# Patient Record
Sex: FEMALE | Race: WHITE | Employment: STUDENT | URBAN - METROPOLITAN AREA
[De-identification: names, ages, dates, MRNs, and addresses within clinical notes are randomized per-mention and may not be internally consistent; named-entity substitution may affect disease eponyms.]

---

## 2017-02-27 DIAGNOSIS — F41.9 ANXIETY: ICD-10-CM

## 2017-02-27 RX ORDER — SERTRALINE HYDROCHLORIDE 100 MG/1
100 TABLET, FILM COATED ORAL DAILY
Qty: 30 TAB | Refills: 0 | Status: SHIPPED | OUTPATIENT
Start: 2017-02-27 | End: 2020-10-09

## 2019-08-09 LAB
HBSAG, EXTERNAL: NEGATIVE
HIV, EXTERNAL: NONREACTIVE
RUBELLA, EXTERNAL: 3.79
T. PALLIDUM, EXTERNAL: NEGATIVE
TYPE, ABO & RH, EXTERNAL: NORMAL

## 2020-03-05 LAB — GRBS, EXTERNAL: POSITIVE

## 2020-04-05 ENCOUNTER — HOSPITAL ENCOUNTER (INPATIENT)
Age: 25
LOS: 3 days | Discharge: HOME | End: 2020-04-08
Attending: OBSTETRICS & GYNECOLOGY | Admitting: OBSTETRICS & GYNECOLOGY
Payer: COMMERCIAL

## 2020-04-05 ENCOUNTER — ANESTHESIA EVENT (OUTPATIENT)
Dept: LABOR AND DELIVERY | Age: 25
End: 2020-04-05
Payer: COMMERCIAL

## 2020-04-05 ENCOUNTER — ANESTHESIA (OUTPATIENT)
Dept: LABOR AND DELIVERY | Age: 25
End: 2020-04-05
Payer: COMMERCIAL

## 2020-04-05 LAB
ALBUMIN SERPL-MCNC: 3.1 G/DL (ref 3.5–5)
ALBUMIN/GLOB SERPL: 0.8 {RATIO} (ref 1.1–2.2)
ALP SERPL-CCNC: 144 U/L (ref 45–117)
ALT SERPL-CCNC: 14 U/L (ref 12–78)
ANION GAP SERPL CALC-SCNC: 8 MMOL/L (ref 5–15)
AST SERPL-CCNC: 14 U/L (ref 15–37)
BASOPHILS # BLD: 0 K/UL (ref 0–0.1)
BASOPHILS NFR BLD: 0 % (ref 0–1)
BILIRUB SERPL-MCNC: 0.2 MG/DL (ref 0.2–1)
BUN SERPL-MCNC: 6 MG/DL (ref 6–20)
BUN/CREAT SERPL: 10 (ref 12–20)
CALCIUM SERPL-MCNC: 8.8 MG/DL (ref 8.5–10.1)
CHLORIDE SERPL-SCNC: 107 MMOL/L (ref 97–108)
CO2 SERPL-SCNC: 21 MMOL/L (ref 21–32)
CREAT SERPL-MCNC: 0.62 MG/DL (ref 0.55–1.02)
DIFFERENTIAL METHOD BLD: ABNORMAL
EOSINOPHIL # BLD: 0.1 K/UL (ref 0–0.4)
EOSINOPHIL NFR BLD: 1 % (ref 0–7)
ERYTHROCYTE [DISTWIDTH] IN BLOOD BY AUTOMATED COUNT: 12.1 % (ref 11.5–14.5)
GLOBULIN SER CALC-MCNC: 3.8 G/DL (ref 2–4)
GLUCOSE SERPL-MCNC: 86 MG/DL (ref 65–100)
HCT VFR BLD AUTO: 31.7 % (ref 35–47)
HGB BLD-MCNC: 10.7 G/DL (ref 11.5–16)
IMM GRANULOCYTES # BLD AUTO: 0.1 K/UL (ref 0–0.04)
IMM GRANULOCYTES NFR BLD AUTO: 1 % (ref 0–0.5)
LDH SERPL L TO P-CCNC: 146 U/L (ref 81–246)
LYMPHOCYTES # BLD: 1.5 K/UL (ref 0.8–3.5)
LYMPHOCYTES NFR BLD: 16 % (ref 12–49)
MCH RBC QN AUTO: 31.3 PG (ref 26–34)
MCHC RBC AUTO-ENTMCNC: 33.8 G/DL (ref 30–36.5)
MCV RBC AUTO: 92.7 FL (ref 80–99)
MONOCYTES # BLD: 0.7 K/UL (ref 0–1)
MONOCYTES NFR BLD: 7 % (ref 5–13)
NEUTS SEG # BLD: 7.2 K/UL (ref 1.8–8)
NEUTS SEG NFR BLD: 75 % (ref 32–75)
NRBC # BLD: 0 K/UL (ref 0–0.01)
NRBC BLD-RTO: 0 PER 100 WBC
PLATELET # BLD AUTO: 237 K/UL (ref 150–400)
PMV BLD AUTO: 10.3 FL (ref 8.9–12.9)
POTASSIUM SERPL-SCNC: 3.9 MMOL/L (ref 3.5–5.1)
PROT SERPL-MCNC: 6.9 G/DL (ref 6.4–8.2)
RBC # BLD AUTO: 3.42 M/UL (ref 3.8–5.2)
SODIUM SERPL-SCNC: 136 MMOL/L (ref 136–145)
URATE SERPL-MCNC: 4.4 MG/DL (ref 2.6–6)
WBC # BLD AUTO: 9.5 K/UL (ref 3.6–11)

## 2020-04-05 PROCEDURE — 75410000002 HC LABOR FEE PER 1 HR

## 2020-04-05 PROCEDURE — 74011000258 HC RX REV CODE- 258: Performed by: ADVANCED PRACTICE MIDWIFE

## 2020-04-05 PROCEDURE — 99285 EMERGENCY DEPT VISIT HI MDM: CPT

## 2020-04-05 PROCEDURE — 36415 COLL VENOUS BLD VENIPUNCTURE: CPT

## 2020-04-05 PROCEDURE — 74011250636 HC RX REV CODE- 250/636: Performed by: ADVANCED PRACTICE MIDWIFE

## 2020-04-05 PROCEDURE — 80053 COMPREHEN METABOLIC PANEL: CPT

## 2020-04-05 PROCEDURE — 65270000029 HC RM PRIVATE

## 2020-04-05 PROCEDURE — 84550 ASSAY OF BLOOD/URIC ACID: CPT

## 2020-04-05 PROCEDURE — 85025 COMPLETE CBC W/AUTO DIFF WBC: CPT

## 2020-04-05 PROCEDURE — 83615 LACTATE (LD) (LDH) ENZYME: CPT

## 2020-04-05 RX ORDER — NICOTINE POLACRILEX 2 MG
1 GUM BUCCAL DAILY
COMMUNITY
End: 2020-10-09

## 2020-04-05 RX ORDER — SODIUM CHLORIDE 0.9 % (FLUSH) 0.9 %
5-40 SYRINGE (ML) INJECTION EVERY 8 HOURS
Status: DISCONTINUED | OUTPATIENT
Start: 2020-04-05 | End: 2020-04-06 | Stop reason: HOSPADM

## 2020-04-05 RX ORDER — NALOXONE HYDROCHLORIDE 0.4 MG/ML
0.4 INJECTION, SOLUTION INTRAMUSCULAR; INTRAVENOUS; SUBCUTANEOUS AS NEEDED
Status: DISCONTINUED | OUTPATIENT
Start: 2020-04-05 | End: 2020-04-06 | Stop reason: HOSPADM

## 2020-04-05 RX ORDER — BUPIVACAINE HYDROCHLORIDE 5 MG/ML
30 INJECTION, SOLUTION EPIDURAL; INTRACAUDAL AS NEEDED
Status: DISCONTINUED | OUTPATIENT
Start: 2020-04-05 | End: 2020-04-06 | Stop reason: HOSPADM

## 2020-04-05 RX ORDER — MAG HYDROX/ALUMINUM HYD/SIMETH 200-200-20
30 SUSPENSION, ORAL (FINAL DOSE FORM) ORAL
Status: DISCONTINUED | OUTPATIENT
Start: 2020-04-05 | End: 2020-04-06 | Stop reason: HOSPADM

## 2020-04-05 RX ORDER — OXYTOCIN/0.9 % SODIUM CHLORIDE 30/500 ML
0-25 PLASTIC BAG, INJECTION (ML) INTRAVENOUS
Status: DISCONTINUED | OUTPATIENT
Start: 2020-04-05 | End: 2020-04-06 | Stop reason: HOSPADM

## 2020-04-05 RX ORDER — SODIUM CHLORIDE 0.9 % (FLUSH) 0.9 %
5-40 SYRINGE (ML) INJECTION AS NEEDED
Status: DISCONTINUED | OUTPATIENT
Start: 2020-04-05 | End: 2020-04-06 | Stop reason: HOSPADM

## 2020-04-05 RX ORDER — SODIUM CHLORIDE, SODIUM LACTATE, POTASSIUM CHLORIDE, CALCIUM CHLORIDE 600; 310; 30; 20 MG/100ML; MG/100ML; MG/100ML; MG/100ML
125 INJECTION, SOLUTION INTRAVENOUS CONTINUOUS
Status: DISCONTINUED | OUTPATIENT
Start: 2020-04-05 | End: 2020-04-08 | Stop reason: HOSPADM

## 2020-04-05 RX ORDER — EPHEDRINE SULFATE/0.9% NACL/PF 50 MG/5 ML
12.5 SYRINGE (ML) INTRAVENOUS
Status: DISCONTINUED | OUTPATIENT
Start: 2020-04-05 | End: 2020-04-06 | Stop reason: HOSPADM

## 2020-04-05 RX ORDER — DIPHENHYDRAMINE HYDROCHLORIDE 50 MG/ML
12.5 INJECTION, SOLUTION INTRAMUSCULAR; INTRAVENOUS
Status: DISCONTINUED | OUTPATIENT
Start: 2020-04-05 | End: 2020-04-06 | Stop reason: HOSPADM

## 2020-04-05 RX ADMIN — SODIUM CHLORIDE 5 MILLION UNITS: 900 INJECTION, SOLUTION INTRAVENOUS at 22:00

## 2020-04-05 RX ADMIN — SODIUM CHLORIDE, SODIUM LACTATE, POTASSIUM CHLORIDE, AND CALCIUM CHLORIDE 125 ML/HR: 600; 310; 30; 20 INJECTION, SOLUTION INTRAVENOUS at 21:48

## 2020-04-06 PROCEDURE — 75410000003 HC RECOV DEL/VAG/CSECN EA 0.5 HR

## 2020-04-06 PROCEDURE — 74011000250 HC RX REV CODE- 250: Performed by: ANESTHESIOLOGY

## 2020-04-06 PROCEDURE — 74011000258 HC RX REV CODE- 258: Performed by: ADVANCED PRACTICE MIDWIFE

## 2020-04-06 PROCEDURE — 74011250637 HC RX REV CODE- 250/637: Performed by: OBSTETRICS & GYNECOLOGY

## 2020-04-06 PROCEDURE — 74011250636 HC RX REV CODE- 250/636: Performed by: ADVANCED PRACTICE MIDWIFE

## 2020-04-06 PROCEDURE — 0HQ9XZZ REPAIR PERINEUM SKIN, EXTERNAL APPROACH: ICD-10-PCS | Performed by: OBSTETRICS & GYNECOLOGY

## 2020-04-06 PROCEDURE — 65410000002 HC RM PRIVATE OB

## 2020-04-06 PROCEDURE — 75410000000 HC DELIVERY VAGINAL/SINGLE

## 2020-04-06 PROCEDURE — 00HU33Z INSERTION OF INFUSION DEVICE INTO SPINAL CANAL, PERCUTANEOUS APPROACH: ICD-10-PCS | Performed by: ANESTHESIOLOGY

## 2020-04-06 PROCEDURE — 74011250636 HC RX REV CODE- 250/636

## 2020-04-06 PROCEDURE — A4300 CATH IMPL VASC ACCESS PORTAL: HCPCS

## 2020-04-06 PROCEDURE — 76060000078 HC EPIDURAL ANESTHESIA

## 2020-04-06 PROCEDURE — 75410000002 HC LABOR FEE PER 1 HR

## 2020-04-06 RX ORDER — SIMETHICONE 80 MG
80 TABLET,CHEWABLE ORAL
Status: DISCONTINUED | OUTPATIENT
Start: 2020-04-06 | End: 2020-04-08 | Stop reason: HOSPADM

## 2020-04-06 RX ORDER — IBUPROFEN 400 MG/1
800 TABLET ORAL EVERY 8 HOURS
Status: DISCONTINUED | OUTPATIENT
Start: 2020-04-06 | End: 2020-04-08 | Stop reason: HOSPADM

## 2020-04-06 RX ORDER — ONDANSETRON 4 MG/1
4 TABLET, ORALLY DISINTEGRATING ORAL
Status: DISCONTINUED | OUTPATIENT
Start: 2020-04-06 | End: 2020-04-08 | Stop reason: HOSPADM

## 2020-04-06 RX ORDER — ONDANSETRON 2 MG/ML
4 INJECTION INTRAMUSCULAR; INTRAVENOUS
Status: DISCONTINUED | OUTPATIENT
Start: 2020-04-06 | End: 2020-04-08 | Stop reason: HOSPADM

## 2020-04-06 RX ORDER — HYDROCORTISONE 1 %
CREAM (GRAM) TOPICAL AS NEEDED
Status: DISCONTINUED | OUTPATIENT
Start: 2020-04-06 | End: 2020-04-08 | Stop reason: HOSPADM

## 2020-04-06 RX ORDER — BUPIVACAINE HYDROCHLORIDE 5 MG/ML
INJECTION, SOLUTION EPIDURAL; INTRACAUDAL AS NEEDED
Status: DISCONTINUED | OUTPATIENT
Start: 2020-04-06 | End: 2020-04-06 | Stop reason: HOSPADM

## 2020-04-06 RX ORDER — HYDROCORTISONE ACETATE PRAMOXINE HCL 2.5; 1 G/100G; G/100G
CREAM TOPICAL AS NEEDED
Status: DISCONTINUED | OUTPATIENT
Start: 2020-04-06 | End: 2020-04-08 | Stop reason: HOSPADM

## 2020-04-06 RX ORDER — ZOLPIDEM TARTRATE 5 MG/1
5 TABLET ORAL
Status: DISCONTINUED | OUTPATIENT
Start: 2020-04-06 | End: 2020-04-08 | Stop reason: HOSPADM

## 2020-04-06 RX ORDER — SODIUM CHLORIDE 0.9 % (FLUSH) 0.9 %
5-40 SYRINGE (ML) INJECTION AS NEEDED
Status: DISCONTINUED | OUTPATIENT
Start: 2020-04-06 | End: 2020-04-08 | Stop reason: HOSPADM

## 2020-04-06 RX ORDER — ACETAMINOPHEN 325 MG/1
650 TABLET ORAL
Status: DISCONTINUED | OUTPATIENT
Start: 2020-04-06 | End: 2020-04-08 | Stop reason: HOSPADM

## 2020-04-06 RX ORDER — DOCUSATE SODIUM 100 MG/1
100 CAPSULE, LIQUID FILLED ORAL
Status: DISCONTINUED | OUTPATIENT
Start: 2020-04-06 | End: 2020-04-08 | Stop reason: HOSPADM

## 2020-04-06 RX ORDER — OXYTOCIN/RINGER'S LACTATE 20/1000 ML
999 PLASTIC BAG, INJECTION (ML) INTRAVENOUS AS NEEDED
Status: DISCONTINUED | OUTPATIENT
Start: 2020-04-06 | End: 2020-04-08 | Stop reason: HOSPADM

## 2020-04-06 RX ORDER — OXYCODONE AND ACETAMINOPHEN 5; 325 MG/1; MG/1
1 TABLET ORAL
Status: DISCONTINUED | OUTPATIENT
Start: 2020-04-06 | End: 2020-04-08 | Stop reason: HOSPADM

## 2020-04-06 RX ORDER — ONDANSETRON 2 MG/ML
INJECTION INTRAMUSCULAR; INTRAVENOUS
Status: COMPLETED
Start: 2020-04-06 | End: 2020-04-06

## 2020-04-06 RX ORDER — OXYTOCIN/RINGER'S LACTATE 20/1000 ML
125 PLASTIC BAG, INJECTION (ML) INTRAVENOUS AS NEEDED
Status: DISCONTINUED | OUTPATIENT
Start: 2020-04-06 | End: 2020-04-08 | Stop reason: HOSPADM

## 2020-04-06 RX ORDER — DIPHENHYDRAMINE HCL 25 MG
25 CAPSULE ORAL
Status: DISCONTINUED | OUTPATIENT
Start: 2020-04-06 | End: 2020-04-08 | Stop reason: HOSPADM

## 2020-04-06 RX ORDER — AMMONIA 15 % (W/V)
1 AMPUL (EA) INHALATION AS NEEDED
Status: DISCONTINUED | OUTPATIENT
Start: 2020-04-06 | End: 2020-04-08 | Stop reason: HOSPADM

## 2020-04-06 RX ORDER — LIDOCAINE HYDROCHLORIDE AND EPINEPHRINE 15; 5 MG/ML; UG/ML
INJECTION, SOLUTION EPIDURAL AS NEEDED
Status: DISCONTINUED | OUTPATIENT
Start: 2020-04-06 | End: 2020-04-06 | Stop reason: HOSPADM

## 2020-04-06 RX ORDER — SODIUM CHLORIDE 0.9 % (FLUSH) 0.9 %
5-40 SYRINGE (ML) INJECTION EVERY 8 HOURS
Status: DISCONTINUED | OUTPATIENT
Start: 2020-04-06 | End: 2020-04-08 | Stop reason: HOSPADM

## 2020-04-06 RX ADMIN — ACETAMINOPHEN 650 MG: 325 TABLET, FILM COATED ORAL at 19:56

## 2020-04-06 RX ADMIN — SODIUM CHLORIDE, SODIUM LACTATE, POTASSIUM CHLORIDE, AND CALCIUM CHLORIDE 125 ML/HR: 600; 310; 30; 20 INJECTION, SOLUTION INTRAVENOUS at 09:44

## 2020-04-06 RX ADMIN — SODIUM CHLORIDE 2.5 MILLION UNITS: 9 INJECTION, SOLUTION INTRAVENOUS at 13:59

## 2020-04-06 RX ADMIN — SODIUM CHLORIDE 2.5 MILLION UNITS: 9 INJECTION, SOLUTION INTRAVENOUS at 02:09

## 2020-04-06 RX ADMIN — BUPIVACAINE HYDROCHLORIDE 2 ML: 5 INJECTION, SOLUTION EPIDURAL; INTRACAUDAL; PERINEURAL at 11:14

## 2020-04-06 RX ADMIN — BUPIVACAINE HYDROCHLORIDE 11 ML/HR: 5 INJECTION, SOLUTION EPIDURAL; INTRACAUDAL; PERINEURAL at 12:17

## 2020-04-06 RX ADMIN — BUPIVACAINE HYDROCHLORIDE 10 ML/HR: 5 INJECTION, SOLUTION EPIDURAL; INTRACAUDAL; PERINEURAL at 11:17

## 2020-04-06 RX ADMIN — SODIUM CHLORIDE 2.5 MILLION UNITS: 9 INJECTION, SOLUTION INTRAVENOUS at 06:12

## 2020-04-06 RX ADMIN — OXYTOCIN 10 MILLI-UNITS/MIN: 10 INJECTION, SOLUTION INTRAMUSCULAR; INTRAVENOUS at 10:50

## 2020-04-06 RX ADMIN — IBUPROFEN 800 MG: 400 TABLET, FILM COATED ORAL at 16:08

## 2020-04-06 RX ADMIN — LIDOCAINE HYDROCHLORIDE,EPINEPHRINE BITARTRATE 5 ML: 15; .005 INJECTION, SOLUTION EPIDURAL; INFILTRATION; INTRACAUDAL; PERINEURAL at 11:14

## 2020-04-06 RX ADMIN — OXYTOCIN 2 MILLI-UNITS/MIN: 10 INJECTION, SOLUTION INTRAMUSCULAR; INTRAVENOUS at 03:04

## 2020-04-06 RX ADMIN — ACETAMINOPHEN 650 MG: 325 TABLET, FILM COATED ORAL at 00:59

## 2020-04-06 RX ADMIN — SODIUM CHLORIDE 2.5 MILLION UNITS: 9 INJECTION, SOLUTION INTRAVENOUS at 09:50

## 2020-04-06 RX ADMIN — BUPIVACAINE HYDROCHLORIDE 10 ML/HR: 5 INJECTION, SOLUTION EPIDURAL; INTRACAUDAL; PERINEURAL at 11:09

## 2020-04-06 RX ADMIN — ONDANSETRON 4 MG: 2 INJECTION, SOLUTION INTRAMUSCULAR; INTRAVENOUS at 14:32

## 2020-04-06 NOTE — PROGRESS NOTES
0740-Bedside report from OZIEL Del Toro RN  0900-Walchers maneuver   0913-knee chest  0924-standing at bedside, leaning forward onto bed, rocking hips  0947-birthing ball  1108-epidural placed by Dr Castillo Lowers  1139-SVE as pt is still feeling intense pressure, 6-7/100/0  1227-sidelying pelvic release left side, pt has pain lower left  1241-sidelying pelvic release right side  1250-pt now feeling pressure in center  1409-straight cath done  1410-SVE C/C/+3  1412-pushing started, [place] notified  1430-RN at bedside, continuously assessing FHR tracing  1449-RN at bedside, continuously assessing FHR tracing   Dr Dea Lyon, skin to skin  1540-breastfeeding, mostly hand expression with on and off latching  1700-up to wheelchair  1710-TRANSFER - OUT REPORT:    Verbal report given to ZANDRA Cross(name) on Gissell Pendleton  being transferred to [facility](unit) for routine progression of care       Report consisted of patients Situation, Background, Assessment and   Recommendations(SBAR). Information from the following report(s) SBAR, Intake/Output, MAR and Recent Results was reviewed with the receiving nurse. Lines:   Peripheral IV 20 Anterior; Left Forearm (Active)   Site Assessment Clean, dry, & intact 2020  9:45 PM   Phlebitis Assessment 0 2020  9:45 PM   Infiltration Assessment 0 2020  9:45 PM   Dressing Status Clean, dry, & intact 2020  9:45 PM   Dressing Type Tape;Transparent 2020  9:45 PM   Hub Color/Line Status Pink; Infusing;Patent 2020  9:45 PM   Action Taken Blood drawn 2020  9:45 PM        Opportunity for questions and clarification was provided.       Patient transported with:   Registered Nurse

## 2020-04-06 NOTE — H&P
DARA History and Physical to Inpatient    Patient: Mars Nash MRN: [id number]  SSN: [id number]    YOB: 1995  Age: 22 y.o. Sex: female      Subjective:      Mars Nash is a 22 y.o. female  at 44w3d with an ALVINO of 20. She presents to labor and delivery for leaking of fluid and vaginal spotting since 1500 this afternoon. Reports she started to feel some leaking at 1500 and it has continued since that time. It is clear and water like with some bloody discharge mixed in. Reports some mild contractions, not very intense yet. Reports good fetal movement. Pregnancy complicated by LGA fetus- 96% at 35 week scan. Pt is 5'10 and FOB is 6'11. Pt is also GBS positive. Otherwise, pregnancy has been uneventful. Past Medical History:   Diagnosis Date    Headache      No past surgical history on file. Family History   Problem Relation Age of Onset    Cancer Father         melanoma    Diabetes Other      Social History     Tobacco Use    Smoking status: Never Smoker    Smokeless tobacco: Never Used   Substance Use Topics    Alcohol use: No      Prior to Admission medications    Medication Sig Start Date End Date Taking? Authorizing Provider   sertraline (ZOLOFT) 100 mg tablet Take 1 Tab by mouth daily. Please make a med check appointment in the next month. Thank you. 17   Hakan Cintron MD   fexofenadine-pseudoephedrine (ALLEGRA-D)  mg Tb12 Take 1 Tab by mouth every twelve (12) hours. Provider, Historical   cholecalciferol, vitamin D3, 2,000 unit tab Take 2,000 Units by mouth two (2) times a day. Provider, Historical   cyanocobalamin (VITAMIN B-12) 1,000 mcg tablet Take 1,000 mcg by mouth daily. Provider, Historical   ferrous sulfate (IRON) 325 mg (65 mg iron) tablet Take  by mouth Daily (before breakfast). Provider, Historical   melatonin 3 mg tablet Take  by mouth.     Provider, Historical   polyethylene glycol (MIRALAX) 17 gram packet Take 17 g by mouth daily. Provider, Historical        Allergies   Allergen Reactions    Fentanyl Swelling     Throat swelling       Review of Systems:  A comprehensive review of systems was negative except for that written in the History of Present Illness. Objective: There were no vitals filed for this visit. Physical Exam:  GENERAL: alert, cooperative, no distress, appears stated age  LUNG: clear to auscultation bilaterally  HEART: regular rate and rhythm, S1, S2 normal, no murmur, click, rub or gallop  ABDOMEN: soft, non-tender. Bowel sounds normal. No masses,  no organomegaly, gravid  EXTREMITIES:  extremities normal, atraumatic, no cyanosis or edema  SKIN: Normal.  NEUROLOGIC: negative  PSYCHIATRIC: WNL    SVE: 3/70/-1, vertex, grossly ruptured, nitrazine positive    NST: Monitored for 20 minutes, reactive cat 1, baseline: 150, positive accels, no decels, moderate variability, irregular and mild contractions    Patient Vitals for the past 4 hrs:   Temp Pulse Resp BP   04/05/20 2102 97.5 °F (36.4 °C) 76 18 (!) 136/93         Assessment:     Hospital Problems  Date Reviewed: 4/18/2016          Codes Class Noted POA    Pregnancy ICD-10-CM: Z34.90  ICD-9-CM: V22.2  4/5/2020 Unknown            SROM x 6 hours  A Positive  Rubella Immune  Hep B and HIV Neg  GBS Positive  LGA fetus  Elevated BP on admit    Plan:     Admit to DARA  NST, Nitrazine, cervical exam    Reviewed positive nitrazine and need to transfer to labor and delivery for admission. Pt verbalized understanding and agreement. Admit to inpatient  IV, CBC, STBB, Pre E labs due to elevated BP on admit    Discussed in detail options of expectant management until 3 am (12 hours post rupture), or starting pitocin at this time. Pt desires expectant management at this time, but may elect for pitocin prior to 3 am.     Maternal and fetal monitoring per protocol. PCN for GBS positive status.    Epidural for pain control when pt desires  Anticipate vaginal delivery      Signed By: Johnathon Adams, CN     April 5, 2020

## 2020-04-06 NOTE — PROGRESS NOTES
2114 - Bedside and Verbal shift change report given to IAN Isidro RN (oncoming nurse) by JANICE Herron RN (offgoing nurse). Report included the following information SBAR. Arash Funes CNM at bedside talking with patient about plan of care and augmentation with pitocin or waiting for labor to start on its own.    2150 - Bedside and Verbal shift change report given to CARRIE Cade RN (oncoming nurse) by IAN Isidro RN (offgoing nurse). Report included the following information SBAR.

## 2020-04-06 NOTE — ANESTHESIA PROCEDURE NOTES
Epidural Block    Start time: 4/6/2020 11:02 AM  End time: 4/6/2020 11:12 AM  Performed by: Renetta Collazo MD  Authorized by: Renetta Collazo MD     Pre-Procedure  Indication: labor epidural    Preanesthetic Checklist: risks and benefits discussed and timeout performed    Timeout Time: 11:02        Epidural:   Patient position:  Left lateral decubitus  Prep region:  Lumbar  Prep: DuraPrep    Location:  L2-3    Needle and Epidural Catheter:   Needle Type:  Tuohy  Needle Gauge:  17 G  Injection Technique:  Loss of resistance using air  Attempts:  1  Catheter Size:  19 G  Events: no blood with aspiration, no cerebrospinal fluid with aspiration, no paresthesia and negative aspiration test    Test Dose:  Bupivacaine 0.25% and negative    Assessment:   Catheter Secured:  Tegaderm and tape  Insertion:  Uncomplicated  Patient tolerance:  Patient tolerated the procedure well with no immediate complications

## 2020-04-06 NOTE — PROGRESS NOTES
0740 Bedside and Verbal shift change report given to Tom Kohli RN (oncoming nurse) by Malick Garber RN (offgoing nurse). Report included the following information SBAR, Kardex, MAR, Accordion, Recent Results and Med Rec Status.

## 2020-04-06 NOTE — PROGRESS NOTES
In room to meet patient. Feeling CTX. Starting to get a little uncomfortable. Visit Vitals  /69   Pulse 80   Temp 97.9 °F (36.6 °C)   Resp 14   Ht 5' 10\" (1.778 m)   Wt 74.8 kg (165 lb)   Breastfeeding No   BMI 23.68 kg/m²     SVE: Deferred  EFM: Cat 1  [place]: q2-5 mins    A/P: Pt is a 21 yo G1 at 40+4 presenting with term PROM.   Now on pitocin  GBS pos - continue PCN  Epidural PRN  Check PRN  Anticipate

## 2020-04-06 NOTE — PROGRESS NOTES
2150: Bedside shift change report given to CALLY Cade RN (oncoming nurse) by Delia Harvey RN (offgoing nurse). Report included the following information SBAR.

## 2020-04-06 NOTE — PROGRESS NOTES
~1600: The patient arrived ambulatory from home with reports of leaking of fluid since 3pm and spotting. The patient and her  are escorted to [place] [address] for evaluation. ~2055: ANKIT Collins is at the bedside speaking with the patient and her . Nitrazine positive; SVE 3/70/-1.    ~2100: Verbal orders from ANKIT Collins to admit the patient and transfer to &D .  ~2114: TRANSFER - OUT REPORT:    Verbal report given to IAN Isidro RN on Mirella Lopez  being transferred to &D  for routine progression of care       Report consisted of patients Situation, Background, Assessment and   Recommendations(SBAR). Information from the following report(s) SBAR was reviewed with the receiving nurse. Lines:       Opportunity for questions and clarification was provided.       Patient transported with:   Registered Nurse

## 2020-04-06 NOTE — ROUTINE PROCESS
TRANSFER - IN REPORT:    Verbal report received from ANKIT Crane RN(name) on Desirae Parsons  being received from [facility](unit) for routine progression of care      Report consisted of patients Situation, Background, Assessment and   Recommendations(SBAR). Information from the following report(s) SBAR was reviewed with the receiving nurse. Opportunity for questions and clarification was provided. Assessment completed upon patients arrival to unit and care assumed.

## 2020-04-06 NOTE — H&P
History & Physical    Name: Ana Coleman MRN: [id number]  SSN: [id number]    YOB: 1995  Age: 22 y.o. Sex: female      Subjective:     Reason for Admission:  Pregnancy and leaking amniotic fluid    History of Present Illness: Ana Coleman is a 22 y.o.  female with an estimated gestational age of 36w2d with Estimated Date of Delivery: 20. Patient complains of mild vaginal leaking of fluid for 1 days. Pregnancy has been complicated by none. Patient denies abdominal pain  , chest pain, contractions, fever, headache , nausea and vomiting, pelvic pressure, right upper quadrant pain  , shortness of breath, swelling, vaginal bleeding  and visual disturbances. Ana Coleman is a 22 y.o. female  at 44w3d with an ALVINO of 20. She presents to labor and delivery for leaking of fluid and vaginal spotting since 1500 this afternoon. Reports she started to feel some leaking at 1500 and it has continued since that time. It is clear and water like with some bloody discharge mixed in. Reports some mild contractions, not very intense yet. Reports good fetal movement.      Pregnancy complicated by LGA fetus- 96% at 35 week scan. Pt is 5'10 and FOB is 6'11. Pt is also GBS positive.  Otherwise, pregnancy has been uneventful.       OB History        1    Para        Term                AB        Living           SAB        TAB        Ectopic        Molar        Multiple        Live Births                  Past Medical History:   Diagnosis Date    Anemia     Chlamydia     not during pregnancy    Headache      Past Surgical History:   Procedure Laterality Date    HX OTHER SURGICAL      wisdom teeth      Social History     Occupational History    Not on file   Tobacco Use    Smoking status: Never Smoker    Smokeless tobacco: Never Used   Substance and Sexual Activity    Alcohol use: No    Drug use: No    Sexual activity: Never     Family History   Problem Relation Age of Onset    Cancer Father         melanoma    Diabetes Other        Allergies   Allergen Reactions    Fentanyl Swelling     Throat swelling     Prior to Admission medications    Medication Sig Start Date End Date Taking? Authorizing Provider   prenatal vit37/iron/folic acid (PRENATA PO) Take 1 Tab by mouth daily. Yes Provider, Historical   biotin 1 mg cap Take 1 Tab by mouth daily. Yes Provider, Historical   ferrous sulfate (IRON) 325 mg (65 mg iron) tablet Take  by mouth Daily (before breakfast). Yes Provider, Historical   sertraline (ZOLOFT) 100 mg tablet Take 1 Tab by mouth daily. Please make a med check appointment in the next month. Thank you. 17   Zuleima Aguillon MD   fexofenadine-pseudoephedrine (ALLEGRA-D)  mg Tb12 Take 1 Tab by mouth every twelve (12) hours. Provider, Historical   cholecalciferol, vitamin D3, 2,000 unit tab Take 2,000 Units by mouth two (2) times a day. Provider, Historical   cyanocobalamin (VITAMIN B-12) 1,000 mcg tablet Take 1,000 mcg by mouth daily. Provider, Historical   melatonin 3 mg tablet Take  by mouth. Provider, Historical   polyethylene glycol (MIRALAX) 17 gram packet Take 17 g by mouth daily.     Provider, Historical        Review of Systems    Objective:     Vitals:    Vitals:    20 2102 20 2105 20 2144 20 2239   BP: (!) 136/93  132/80 123/74   Pulse: 76  65 85   Resp: 18   15   Temp: 97.5 °F (36.4 °C)   97.8 °F (36.6 °C)   Weight:  74.8 kg (165 lb)     Height:  5' 10\" (1.778 m)        Temp (24hrs), Av.7 °F (36.5 °C), Min:97.5 °F (36.4 °C), Max:97.8 °F (36.6 °C)    BP  Min: 123/74  Max: 136/93     Physical Exam  A/o X 3 NAD  VSS - reviewed    SVE: 3/70/-1, vertex, grossly ruptured, nitrazine positive     NST: Monitored for 20 minutes, reactive cat 1, baseline: 150, positive accels, no decels, moderate variability, irregular and mild contractions     Patient Vitals for the past 4 hrs:    Temp Pulse Resp BP   04/05/20 2102 97.5 °F (36.4 °C) 76 18 (!) 136/93           Labs:   Recent Results (from the past 24 hour(s))   CBC WITH AUTOMATED DIFF    Collection Time: 04/05/20  9:49 PM   Result Value Ref Range    WBC 9.5 3.6 - 11.0 K/uL    RBC 3.42 (L) 3.80 - 5.20 M/uL    HGB 10.7 (L) 11.5 - 16.0 g/dL    HCT 31.7 (L) 35.0 - 47.0 %    MCV 92.7 80.0 - 99.0 FL    MCH 31.3 26.0 - 34.0 PG    MCHC 33.8 30.0 - 36.5 g/dL    RDW 12.1 11.5 - 14.5 %    PLATELET 687 159 - 054 K/uL    MPV 10.3 8.9 - 12.9 FL    NRBC 0.0 0  WBC    ABSOLUTE NRBC 0.00 0.00 - 0.01 K/uL    NEUTROPHILS 75 32 - 75 %    LYMPHOCYTES 16 12 - 49 %    MONOCYTES 7 5 - 13 %    EOSINOPHILS 1 0 - 7 %    BASOPHILS 0 0 - 1 %    IMMATURE GRANULOCYTES 1 (H) 0.0 - 0.5 %    ABS. NEUTROPHILS 7.2 1.8 - 8.0 K/UL    ABS. LYMPHOCYTES 1.5 0.8 - 3.5 K/UL    ABS. MONOCYTES 0.7 0.0 - 1.0 K/UL    ABS. EOSINOPHILS 0.1 0.0 - 0.4 K/UL    ABS. BASOPHILS 0.0 0.0 - 0.1 K/UL    ABS. IMM. GRANS. 0.1 (H) 0.00 - 0.04 K/UL    DF AUTOMATED     METABOLIC PANEL, COMPREHENSIVE    Collection Time: 04/05/20  9:49 PM   Result Value Ref Range    Sodium 136 136 - 145 mmol/L    Potassium 3.9 3.5 - 5.1 mmol/L    Chloride 107 97 - 108 mmol/L    CO2 21 21 - 32 mmol/L    Anion gap 8 5 - 15 mmol/L    Glucose 86 65 - 100 mg/dL    BUN 6 6 - 20 MG/DL    Creatinine 0.62 0.55 - 1.02 MG/DL    BUN/Creatinine ratio 10 (L) 12 - 20      GFR est AA >60 >60 ml/min/1.73m2    GFR est non-AA >60 >60 ml/min/1.73m2    Calcium 8.8 8.5 - 10.1 MG/DL    Bilirubin, total 0.2 0.2 - 1.0 MG/DL    ALT (SGPT) 14 12 - 78 U/L    AST (SGOT) 14 (L) 15 - 37 U/L    Alk.  phosphatase 144 (H) 45 - 117 U/L    Protein, total 6.9 6.4 - 8.2 g/dL    Albumin 3.1 (L) 3.5 - 5.0 g/dL    Globulin 3.8 2.0 - 4.0 g/dL    A-G Ratio 0.8 (L) 1.1 - 2.2     URIC ACID    Collection Time: 04/05/20  9:49 PM   Result Value Ref Range    Uric acid 4.4 2.6 - 6.0 MG/DL   LD    Collection Time: 04/05/20  9:49 PM   Result Value Ref Range     81 - 246 U/L       Patient Active Problem List   Diagnosis Code    Iron deficiency anemia D50.9    B12 deficiency E53.8    Anxiety F41.9    Pregnancy Z34.90     Assessment and Plan:       1. PROM @ 40w4d- expectant management now, then Augmentation PRN  2. H/o Anemia- cbc, T&S PRN- at risk [facility]  3.  Anxiety- reassured, Plan of care discussed      Signed By:  Rasheed Erickson MD     April 6, 2020                 istor

## 2020-04-06 NOTE — PROCEDURES
Delivery Summary  Patient: Nhan Gave             Circumcision:   desires  Additional Delivery Comments - Term PROM, pitocin augmentation. GBS+. Uncomplicated . Small first degree repaired with 3-0 vicryl. Bilateral labial lacerations. Left hemostatic, right bleeding from inferior edges repaired with 3-0 vicryl in a running fashion.     Information for the patient's :  Darylene Silos [[id number]]     Delivery Type: Vaginal, Spontaneous   Delivery Date: 2020   Delivery Time: 2:49 PM     Birth Weight:       Sex:  male  Delivery Clinician:  Diana Beasley   Gestational Age: 36w2d    Presentation: Compound   Position:             Apgars were 9  and 9      Resuscitation Method: Tactile Stimulation     Meconium Stained: None    Living Status: Living       Placenta Date/Time: 2020  2:53 PM   Placenta Removal: Spontaneous   Placenta Appearance: Normal    Cord Information: 3 Vessels    Cord Events: None       Disposition of Cord Blood: Discard    Blood Gases Sent?:  No       Cord pH:  none    Episiotomy: None   Laceration(s): 1st     Estimated Blood Loss (ml): 250mL    Labor Events  Method:        Augmentation: Oxytocin   Cervical Ripening:       None        Operative Vaginal Delivery - none

## 2020-04-07 PROCEDURE — 74011250637 HC RX REV CODE- 250/637: Performed by: OBSTETRICS & GYNECOLOGY

## 2020-04-07 PROCEDURE — 65410000002 HC RM PRIVATE OB

## 2020-04-07 RX ORDER — IBUPROFEN 800 MG/1
800 TABLET ORAL
Qty: 40 TAB | Refills: 1 | Status: SHIPPED | OUTPATIENT
Start: 2020-04-07

## 2020-04-07 RX ADMIN — IBUPROFEN 800 MG: 400 TABLET, FILM COATED ORAL at 02:09

## 2020-04-07 RX ADMIN — ACETAMINOPHEN 650 MG: 325 TABLET, FILM COATED ORAL at 02:09

## 2020-04-07 RX ADMIN — DOCUSATE SODIUM 100 MG: 100 CAPSULE, LIQUID FILLED ORAL at 10:31

## 2020-04-07 RX ADMIN — ACETAMINOPHEN 650 MG: 325 TABLET, FILM COATED ORAL at 15:18

## 2020-04-07 RX ADMIN — ACETAMINOPHEN 650 MG: 325 TABLET, FILM COATED ORAL at 10:30

## 2020-04-07 RX ADMIN — IBUPROFEN 800 MG: 400 TABLET, FILM COATED ORAL at 18:25

## 2020-04-07 RX ADMIN — IBUPROFEN 800 MG: 400 TABLET, FILM COATED ORAL at 10:33

## 2020-04-07 RX ADMIN — ACETAMINOPHEN 650 MG: 325 TABLET, FILM COATED ORAL at 20:28

## 2020-04-07 NOTE — ROUTINE PROCESS
1500:Bedside and Verbal shift change report given to MARCIE Andrade (oncoming nurse) by Jewel Cazares (offgoing nurse). Report included the following information SBAR.

## 2020-04-07 NOTE — PROGRESS NOTES
Post-Partum Day Number 1 Progress Note    Juan José Adkins     Assessment:   Doing well, post partum day 1  Boy - circ today    Plan:  1. Continue routine postpartum and perineal care as well as maternal education. 2. The risks and benefits of the circumcision  procedure and anesthesia including: bleeding, infection, variability of cosmetic results were discussed at length with the mother. She is aware that future repeat procedures may be necessary. She gives informed consent to proceed as noted and her questions are answered. 3. Am d/c in    Information for the patient's :  Ricco Marks [[id number]]   Vaginal, Spontaneous   Patient doing well without significant complaint. Voiding without difficulty, normal lochia. Vitals:  Visit Vitals  /62 (BP 1 Location: Right arm, BP Patient Position: At rest)   Pulse 60   Temp 97.9 °F (36.6 °C)   Resp 14   Ht 5' 10\" (1.778 m)   Wt 74.8 kg (165 lb)   SpO2 100%   Breastfeeding Unknown   BMI 23.68 kg/m²     Temp (24hrs), Av °F (36.7 °C), Min:97.8 °F (36.6 °C), Max:98.4 °F (36.9 °C)        Exam:   Patient without distress. Abdomen soft, fundus firm, nontender                Perineum with normal lochia noted. Lower extremities are negative for swelling, cords or tenderness.     Labs:     Lab Results   Component Value Date/Time    WBC 9.5 2020 09:49 PM    WBC 13.6 (H) 2016 01:20 PM    WBC 7.0 2015 02:59 PM    WBC 3.8 10/06/2015 09:51 AM    WBC 3.0 (L) 2015 10:22 AM    HGB 10.7 (L) 2020 09:49 PM    HGB 10.4 (L) 2016 01:20 PM    HGB 10.4 (L) 2015 02:59 PM    HGB 9.6 (L) 10/06/2015 09:51 AM    HGB 7.2 (L) 2015 10:22 AM    HCT 31.7 (L) 2020 09:49 PM    HCT 32.9 (L) 2016 01:20 PM    HCT 33.3 (L) 2015 02:59 PM    HCT 33.2 (L) 10/06/2015 09:51 AM    HCT 25.5 (L) 2015 10:22 AM    PLATELET 332  09:49 PM    PLATELET 965  01:20 PM PLATELET 958 (H) 70/35/4787 02:59 PM    PLATELET 976 44/77/7686 09:51 AM    PLATELET 974 21/12/1048 10:22 AM       No results found for this or any previous visit (from the past 24 hour(s)).

## 2020-04-07 NOTE — DISCHARGE SUMMARY
Obstetrical Discharge Summary     Name: Ana Coleman MRN: [id number]  SSN: [id number]    YOB: 1995  Age: 22 y.o. Sex: female      Admit Date: 2020    Discharge Date: 2020     Admitting Physician: Radha Wen MD     Attending Physician:  Alfonso Espino, Austyn Pham,*     Admission Diagnoses: Pregnancy [Z34.90]  Pregnancy [Z34.90]    Discharge Diagnoses:   Information for the patient's :  Keren Capellan [[id number]]   Delivery of a 4.14 kg male infant via Vaginal, Spontaneous on 2020 at 2:49 PM  by Virgilio Yoder. Apgars were 9  and 9 . Additional Diagnoses:   Hospital Problems  Date Reviewed: 2016          Codes Class Noted POA    Normal labor and delivery ICD-10-CM: O80  ICD-9-CM: 385  2020 Unknown        Pregnancy ICD-10-CM: Z34.90  ICD-9-CM: V22.2  2020 Unknown             Lab Results   Component Value Date/Time    Rubella, External 3.79 2019    GrBStrep, External positive  2020       Hospital Course: Normal hospital course following the delivery. Boy, circ done. Disposition at Discharge: Home or self care    Discharged Condition: Stable    Patient Instructions:   Current Discharge Medication List      START taking these medications    Details   ibuprofen (MOTRIN) 800 mg tablet Take 1 Tab by mouth every eight (8) hours as needed for Pain. Qty: 40 Tab, Refills: 1         CONTINUE these medications which have NOT CHANGED    Details   prenatal vit37/iron/folic acid (PRENATA PO) Take 1 Tab by mouth daily. biotin 1 mg cap Take 1 Tab by mouth daily. ferrous sulfate (IRON) 325 mg (65 mg iron) tablet Take  by mouth Daily (before breakfast). sertraline (ZOLOFT) 100 mg tablet Take 1 Tab by mouth daily. Please make a med check appointment in the next month. Thank you.   Qty: 30 Tab, Refills: 0    Associated Diagnoses: Anxiety      fexofenadine-pseudoephedrine (ALLEGRA-D)  mg Tb12 Take 1 Tab by mouth every twelve (12) hours. Associated Diagnoses: Sore throat; Body aches; Fever and chills      cholecalciferol, vitamin D3, 2,000 unit tab Take 2,000 Units by mouth two (2) times a day. cyanocobalamin (VITAMIN B-12) 1,000 mcg tablet Take 1,000 mcg by mouth daily. Associated Diagnoses: Chills; Night sweats; Body aches      melatonin 3 mg tablet Take  by mouth.      polyethylene glycol (MIRALAX) 17 gram packet Take 17 g by mouth daily. Reference my discharge instructions.     Follow-up Appointments   Procedures    FOLLOW UP VISIT Appointment in: 6 Weeks     Standing Status:   Standing     Number of Occurrences:   1     Order Specific Question:   Appointment in     Answer:   6 Weeks        Signed By:  Cynthia Pineda MD     April 7, 2020

## 2020-04-07 NOTE — PROGRESS NOTES
Bedside and Verbal shift change report given to [address] [place] (oncoming nurse) by Minor RN (offgoing nurse). Report included the following information SBAR, Kardex, Procedure Summary, Intake/Output and MAR.

## 2020-04-07 NOTE — LACTATION NOTE
This note was copied from a baby's chart. Initial Lactation Consultation - Baby born vaginally yesterday afternoon to a  mom at 40 4/7 weeks gestation. Mom states she had breast changes during her pregnancy and she has been able to express drops of colostrum. She said baby has been latching but then popping off the nipple. I gave mom some tips on positioning and helped her get the baby positioned next to her in the prone position. After several attempts baby was able to get a good latch. He was sucking rhythmically with some audible swallows. He nursed for 10 minutes and then we switched baby to the other breast in the cross cradle hold. Baby latched quicker on the second breast. He nursed another 10 minutes. Feeding Plan: Mother will keep baby skin to skin as often as possible, feed on demand, respond to feeding cues, obtain latch, listen for audible swallowing, be aware of signs of oxytocin release/ cramping, thirst and sleepiness while breastfeeding. Mom will not limit the time the baby is at the breast. She will allow the baby to completely finish one breast and then offer the second breast at each feeding. She will call out as needed for assistance with feedings.

## 2020-04-08 VITALS
TEMPERATURE: 97.8 F | OXYGEN SATURATION: 100 % | HEART RATE: 78 BPM | DIASTOLIC BLOOD PRESSURE: 65 MMHG | RESPIRATION RATE: 14 BRPM | BODY MASS INDEX: 23.62 KG/M2 | HEIGHT: 70 IN | WEIGHT: 165 LBS | SYSTOLIC BLOOD PRESSURE: 123 MMHG

## 2020-04-08 PROCEDURE — 74011250637 HC RX REV CODE- 250/637: Performed by: OBSTETRICS & GYNECOLOGY

## 2020-04-08 RX ADMIN — ACETAMINOPHEN 650 MG: 325 TABLET, FILM COATED ORAL at 09:24

## 2020-04-08 RX ADMIN — ACETAMINOPHEN 650 MG: 325 TABLET, FILM COATED ORAL at 00:28

## 2020-04-08 RX ADMIN — IBUPROFEN 800 MG: 400 TABLET, FILM COATED ORAL at 02:43

## 2020-04-08 RX ADMIN — IBUPROFEN 800 MG: 400 TABLET, FILM COATED ORAL at 10:55

## 2020-04-08 RX ADMIN — DOCUSATE SODIUM 100 MG: 100 CAPSULE, LIQUID FILLED ORAL at 09:24

## 2020-04-08 RX ADMIN — ACETAMINOPHEN 650 MG: 325 TABLET, FILM COATED ORAL at 05:18

## 2020-04-08 NOTE — PROGRESS NOTES
Post-Partum Day Number 2 Progress Note    Brent Aguilar     Assessment: Doing well, post partum day 2    Plan:   1. Discharge home today  2. Follow up in office in 6 weeks with Rolf Brown, Andrea Medina,*  3. Post partum activity advised, diet as tolerated  4. Discharge Medications: ibuprofen, percocet and medications prior to admission    Information for the patient's :  Yael Wilson [[id number]]   Vaginal, Spontaneous   Patient doing well without significant complaint. Voiding without difficulty, normal lochia. Vitals:  Visit Vitals  /65 (BP 1 Location: Right arm, BP Patient Position: At rest)   Pulse 78   Temp 97.8 °F (36.6 °C)   Resp 14   Ht 5' 10\" (1.778 m)   Wt 74.8 kg (165 lb)   SpO2 100%   Breastfeeding Unknown   BMI 23.68 kg/m²     Temp (24hrs), Av.8 °F (36.6 °C), Min:97.6 °F (36.4 °C), Max:97.9 °F (36.6 °C)      Exam:         Patient without distress. Abdomen soft, fundus firm, nontender                 Lower extremities are negative for swelling, cords or tenderness. Labs:     Lab Results   Component Value Date/Time    WBC 9.5 2020 09:49 PM    WBC 13.6 (H) 2016 01:20 PM    WBC 7.0 2015 02:59 PM    WBC 3.8 10/06/2015 09:51 AM    WBC 3.0 (L) 2015 10:22 AM    HGB 10.7 (L) 2020 09:49 PM    HGB 10.4 (L) 2016 01:20 PM    HGB 10.4 (L) 2015 02:59 PM    HGB 9.6 (L) 10/06/2015 09:51 AM    HGB 7.2 (L) 2015 10:22 AM    HCT 31.7 (L) 2020 09:49 PM    HCT 32.9 (L) 2016 01:20 PM    HCT 33.3 (L) 2015 02:59 PM    HCT 33.2 (L) 10/06/2015 09:51 AM    HCT 25.5 (L) 2015 10:22 AM    PLATELET 802  09:49 PM    PLATELET 248  01:20 PM    PLATELET 216 (H)  02:59 PM    PLATELET 889  09:51 AM    PLATELET 985  10:22 AM       No results found for this or any previous visit (from the past 24 hour(s)). [Follow-Up: _____] : a [unfilled] follow-up visit [FreeTextEntry1] : Type 1 diabetes mellitus

## 2020-04-08 NOTE — PROGRESS NOTES
Bedside shift change report given to ROBB Kimbrough (oncoming nurse) by Betsy Ohara. Laura Harvey (offgoing nurse). Report included the following information SBAR.

## 2020-04-08 NOTE — DISCHARGE INSTRUCTIONS
POSTPARTUM DISCHARGE INSTRUCTIONS       Name:  Shaylee Thomas  YOB: 1995  Admission Diagnosis:  Pregnancy [Z34.90]  Pregnancy [Z34.90]     Discharge Diagnosis:    Problem List as of 4/8/2020 Date Reviewed: 4/18/2016          Codes Class Noted - Resolved    Normal labor and delivery ICD-10-CM: O80  ICD-9-CM: 650  4/7/2020 - Present        Pregnancy ICD-10-CM: Z34.90  ICD-9-CM: V22.2  4/5/2020 - Present        Anxiety ICD-10-CM: F41.9  ICD-9-CM: 300.00  12/14/2015 - Present        Iron deficiency anemia ICD-10-CM: D50.9  ICD-9-CM: 280.9  10/6/2015 - Present        B12 deficiency ICD-10-CM: E53.8  ICD-9-CM: 266.2  10/6/2015 - Present            Attending Physician:  Mickie Oconnor,*    Delivery Type:  Vaginal Childbirth with Episiotomy, Laceration or Tear: What To Expect At [address] [place] [address] body will slowly heal in the next few weeks. It is easy to get too tired and overwhelmed during the first weeks after your baby is born. Changes in your hormones can shift your mood without warning. You may find it hard to meet the extra demands on your energy and time. Take it easy on yourself. Follow-up care is a key part of your treatment and safety. Be sure to make and go to all appointments, and call your doctor if you are having problems. It's also a good idea to know your test results and keep a list of the medicines you take. How can you care for yourself at home? Vaginal Bleeding and Cramps  · After delivery, you will have a bloody discharge from the vagina. This will turn pink within a week and then white or yellow after about 10 days. It may last for 2 to 4 weeks or longer, until the uterus has healed. Use pads instead of tampons until you stop bleeding. · Do not worry if you pass some blood clots, as long as they are smaller than a golf ball. If you have a tear or stitches in your vaginal area, change the pad at least every 4 hours to prevent soreness and infection. · You may have cramps for the first few days after childbirth. These are normal and occur as the uterus shrinks to normal size. Take an over-the-counter pain medicine, such as acetaminophen (Tylenol), ibuprofen (Advil, Motrin), or naproxen (Aleve), for cramps. Read and follow all instructions on the label. Do not take aspirin, because it can cause more bleeding. Do not take acetaminophen (Tylenol) and other acetaminophen containing medications (i.e. Percocet) at the same time. Episiotomy, Lacerations or Tears  · If you have stitches, they will dissolve on their own and do not need to be removed. · Put ice or a cold pack on your painful area for 10 to 20 minutes at a time, several times a day, for the first few days. Put a thin cloth between the ice and your skin. · Sit in a few inches of warm water (sitz bath) 3 times a day and after bowel movements. The warm water helps with pain and itching. If you do not have a tub, a warm shower might help. Breast fullness  · Your breasts may overfill (engorge) in the first few days after delivery. To help milk flow and to relieve pain, warm your breasts in the shower or by using warm, moist towels before nursing. · If you are not nursing, do not put warmth on your breasts or touch your breasts. Wear a tight bra or sports bra and use ice until the fullness goes away. This usually takes 2 to 3 days. · Put ice or a cold pack on your breast after nursing to reduce swelling and pain. Put a thin cloth between the ice and your skin. Activity  · Eat a balanced diet. Do not try to lose weight by cutting calories. Keep taking your prenatal vitamins, or take a multivitamin. · Get as much rest as you can. Try to take naps when your baby sleeps during the day. · Get some exercise every day. But do not do any heavy exercise until your doctor says it is okay. · Wait until you are healed (about 4 to 6 weeks) before you have sexual intercourse.  Your doctor will tell you when it is okay to have sex. · Talk to your doctor about birth control. You can get pregnant even before your period returns. Also, you can get pregnant while you are breast-feeding. Mental Health  · Many women get the \"baby blues\" during the first few days after childbirth. You may lose sleep, feel irritable, and cry easily. You may feel happy one minute and sad the next. Hormone changes are one cause of these emotional changes. Also, the demands of a new baby, along with visits from relatives or other family needs, add to a mother's stress. The \"baby blues\" often peak around the fourth day. Then they ease up in less than 2 weeks. · If your moodiness or anxiety lasts for more than 2 weeks, or if you feel like life is not worth living, you may have postpartum depression. This is different for each mother. Some mothers with serious depression may worry intensely about their infant's well-being. Others may feel distant from their child. Some mothers might even feel that they might harm their baby. A mother may have signs of paranoia, wondering if someone is watching her. · With all the changes in your life, you may not know if you are depressed. Pregnancy sometimes causes changes in how you feel that are similar to the symptoms of depression. · Symptoms of depression include:  · Feeling sad or hopeless and losing interest in daily activities. These are the most common symptoms of depression. · Sleeping too much or not enough. · Feeling tired. You may feel as if you have no energy. · Eating too much or too little. · POSTPARTUM SUPPORT INTERNATIONAL (PSI) offers a Warm line; Chat with the Expert phone sessions; Information and Articles about Pregnancy and Postpartum Mood Disorders; Comprehensive List of Free Support Groups; Knowledgeable local coordinators who will offer support, information, and resources; Guide to Resources on [employer]; Calendar of events in the  mood disorders community;  Latest News and Research; and [facility] & [facility] [address] for [employer]. Remember - You are not alone; You are not to blame; With help, you will be well. 7-655-840-PPD(5486). WWW. POSTPARTUM. NET    · Writing or talking about death, such as writing suicide notes or talking about guns, knives, or pills. Keep the numbers for these national suicide hotlines: 3-715-097-TALK (4-449.595.4171) and 1-227-BFNXRMD (2-396.375.5351). If you or someone you know talks about suicide or feeling hopeless, get help right away. Constipation and Hemorrhoids  Drink plenty of fluids, enough so that your urine is light yellow or clear like water. If you have kidney, heart, or liver disease and have to limit fluids, talk with your doctor before you increase the amount of fluids you drink. · Eat plenty of fiber each day. Have a bran muffin or bran cereal for breakfast, and try eating a piece of fruit for a mid-afternoon snack. · For painful, itchy hemorrhoids, put ice or a cold pack on the area several times a day for 10 minutes at a time. Follow this by putting a warm compress on the area for another 10 to 20 minutes or by sitting in a shallow, warm bath. When should you call for help? Call 911 anytime you think you may need emergency care. For example, call if:  · You are thinking of hurting yourself, your baby, or anyone else. · You passed out (lost consciousness). · You have symptoms of a blood clot in your lung (called a pulmonary embolism). These may include:  · Sudden chest pain. · Trouble breathing. · Coughing up blood. Call your doctor now or seek immediate medical care if:  · You have severe vaginal bleeding. · You are soaking through a pad each hour for 2 or more hours. · Your vaginal bleeding seems to be getting heavier or is still bright red 4 days after delivery. · You are dizzy or lightheaded, or you feel like you may faint. · You are vomiting or cannot keep fluids down. · You have a fever.   · You have new or more belly pain. · You pass tissue (not just blood). · Your vaginal discharge smells bad. · Your belly feels tender or full and hard. · Your breasts are continuously painful or red. · You feel sad, anxious, or hopeless for more than a few days. · You have sudden, severe pain in your belly. · You have symptoms of a blood clot in your leg (called a deep vein thrombosis), such as:  · Pain in your calf, back of the knee, thigh, or groin. · Redness and swelling in your leg or groin. · You have symptoms of preeclampsia, such as:  · Sudden swelling of your face, hands, or feet. · New vision problems (such as dimness or blurring). · A severe headache. · Your blood pressure is higher than it should be or rises suddenly. · You have new nausea or vomiting. Watch closely for changes in your health, and be sure to contact your doctor if you have any problems. Additional Information:  Postpartum Support    PARENTS:  Are you feeling sad or depressed? Is it difficult for you to enjoy yourself? Do you feel more irritable or tense? Do you feel anxious or panicky? Are you having difficulty bonding with your baby? Do you feel as if you are \"out of control\" or \"going crazy\"? Are you worried that you might hurt your baby or yourself? FAMILIES: Do you worry that something is wrong but don't know how to help? Do you think that your partner or spouse is having problems coping? Are you worried that it may never get better? While many women experience some mild mood change or \"the blues\" during or after the birth of a child, 1 in 9 women experience more significant symptoms of depression or anxiety. 1 in 10 Dads become depressed during the first year. Things you can do  Being a good parent includes taking care of yourself. If you take care of yourself, you will be able to take better care of your baby and your family.    · Talk to a counselor or healthcare provider who has training in  mood and anxiety problems. · Learn as much as you can about pregnancy and postpartum depression and anxiety. · Get support from family and friends. Ask for help when you need it. · Join a support group in your area or online. · Keep active by walking, stretching or whatever form of exercise helps you to feel better. · Get enough rest and time for yourself. · Eat a healthy diet. · Don't give up! It may take more than one try to get the right help you need. These are general instructions for a healthy lifestyle:    No smoking/ No tobacco products/ Avoid exposure to second hand smoke    Surgeon General's Warning:  Quitting smoking now greatly reduces serious risk to your health. Obesity, smoking, and sedentary lifestyle greatly increases your risk for illness    A healthy diet, regular physical exercise & weight monitoring are important for maintaining a healthy lifestyle    Recognize signs and symptoms of STROKE:    F-face looks uneven    A-arms unable to move or move unevenly    S-speech slurred or non-existent    T-time-call 911 as soon as signs and symptoms begin - DO NOT go       back to bed or wait to see if you get better - TIME IS BRAIN. I have had the opportunity to make my options or choices for discharge. I have received and understand these instructions.

## 2020-04-08 NOTE — SENIOR SERVICES NOTE
0740: Bedside shift change report given to ANKIT Shah RN (oncoming nurse) by Leisa Solo. Sea Ramírez RN (offgoing nurse). Report included the following information SBAR.     1115: I have reviewed discharge instructions with the patient. The patient verbalized understanding. Patient has prescriptions and does not have any further questions at this time. Electronically signed discharge instructions. Verified baby and mother bands and signed footprint sheet.

## 2020-10-09 ENCOUNTER — OFFICE VISIT (OUTPATIENT)
Dept: INTERNAL MEDICINE CLINIC | Age: 25
End: 2020-10-09
Payer: COMMERCIAL

## 2020-10-09 VITALS
HEART RATE: 93 BPM | SYSTOLIC BLOOD PRESSURE: 121 MMHG | OXYGEN SATURATION: 98 % | TEMPERATURE: 97.8 F | HEIGHT: 70 IN | BODY MASS INDEX: 17.9 KG/M2 | RESPIRATION RATE: 14 BRPM | WEIGHT: 125 LBS | DIASTOLIC BLOOD PRESSURE: 77 MMHG

## 2020-10-09 DIAGNOSIS — F41.9 ANXIETY: ICD-10-CM

## 2020-10-09 DIAGNOSIS — R55 SYNCOPE AND COLLAPSE: Primary | ICD-10-CM

## 2020-10-09 PROCEDURE — 99204 OFFICE O/P NEW MOD 45 MIN: CPT | Performed by: INTERNAL MEDICINE

## 2020-10-09 PROCEDURE — 93000 ELECTROCARDIOGRAM COMPLETE: CPT | Performed by: INTERNAL MEDICINE

## 2020-10-09 RX ORDER — NORETHINDRONE ACETATE AND ETHINYL ESTRADIOL 1; .02 MG/1; MG/1
1 TABLET ORAL DAILY
COMMUNITY

## 2020-10-09 RX ORDER — BUSPIRONE HYDROCHLORIDE 5 MG/1
5 TABLET ORAL 3 TIMES DAILY
Qty: 90 TAB | Refills: 1 | Status: SHIPPED | OUTPATIENT
Start: 2020-10-09 | End: 2020-11-17 | Stop reason: SDUPTHER

## 2020-10-09 NOTE — PROGRESS NOTES
Assessment and Plan   Diagnoses and all orders for this visit:    1. Syncope and collapse  -     AMB POC EKG ROUTINE W/ 12 LEADS, INTER & REP  -     CBC WITH AUTOMATED DIFF; Future  -     METABOLIC PANEL, BASIC; Future  2. Anxiety  -     AMB POC EKG ROUTINE W/ 12 LEADS, INTER & REP  -     busPIRone (BUSPAR) 5 mg tablet; Take 1 Tab by mouth three (3) times daily. Has had about 5 episodes of syncope over the past month. Last episode was witnessed by her fiancé. Reports that she was getting bottles out of the room when she leaned over and then tumbled backwards and passed out after a few seconds. Another time, she had stood up was walking and felt herself getting lightheaded and passed out onto the bed. Episodes last only for a few seconds. Associated with palpitations. Denies any associated shaking movements, loss of bowel or bladder control, tongue biting. Reports that she feels her peripheral vision goes out prior to these episodes. Reports that these episodes are not generally associated with stressful events that occurred just prior. For example, her last episode occurred after a particularly bad argument. She is wondering if her symptoms are due to stress. Reports she has been having some worsening headaches recently. Denies any chest pain, shortness of breath. Denies any lightheadedness at other times. Unclear etiology. Neuro exam is normal so we will hold off on head imaging at this time. EKG showing prolonged CA and sinus bradycardia. She is a runner. Orthostatics were negative. Possibly vasovagal?  Patient feels strongly that this may be stress related. Recommend treating anxiety with buspirone 3 times a day. If her stress and anxiety get better but she continues to have syncope or if she notices syncope not associated with stressful events, would consider getting an event monitor.     Over 50% of the 45 minutes face to face with Magi Oats consisted of counseling and/or discussing treatment plans in reference to her syncope and anxiety. Benefits, risks, possible drug interactions, and side effects of all new medications were reviewed with the patient. Pt verbalized understanding. Return to clinic: 1 month for anxiety    Leonila Savage MD  [facility]  10/9/2020    No future appointments. History of Present Illness   Chief Complaint   Syncope    Cristiano Dukes is a 26 y.o. female         Review of Systems   Constitutional: Negative for chills and fever. HENT: Negative for hearing loss. Eyes: Negative for blurred vision. Respiratory: Negative for shortness of breath. Cardiovascular: Negative for chest pain. Gastrointestinal: Negative for abdominal pain, blood in stool, constipation, diarrhea, melena, nausea and vomiting. Genitourinary: Negative for dysuria and hematuria. Musculoskeletal: Negative for joint pain. Skin: Negative for rash. Neurological: Negative for headaches. Past Medical History     Allergies   Allergen Reactions    Fentanyl Swelling     Throat swelling        Current Outpatient Medications   Medication Sig    norethindrone-ethinyl estradiol (MICROGESTIN 1/20) 1-20 mg-mcg tablet Take 1 Tab by mouth daily.  busPIRone (BUSPAR) 5 mg tablet Take 1 Tab by mouth three (3) times daily.  ibuprofen (MOTRIN) 800 mg tablet Take 1 Tab by mouth every eight (8) hours as needed for Pain.  prenatal vit37/iron/folic acid (PRENATA PO) Take 1 Tab by mouth daily.  fexofenadine-pseudoephedrine (ALLEGRA-D)  mg Tb12 Take 1 Tab by mouth every twelve (12) hours.  ferrous sulfate (IRON) 325 mg (65 mg iron) tablet Take  by mouth Daily (before breakfast). No current facility-administered medications for this visit.            Patient Active Problem List   Diagnosis Code    Iron deficiency anemia D50.9    B12 deficiency E53.8    Anxiety F41.9    Pregnancy Z34.90    Normal labor and delivery O80     Past Surgical History:   Procedure Laterality Date    HX OTHER SURGICAL      wisdom teeth       Social History     Tobacco Use    Smoking status: Never Smoker    Smokeless tobacco: Never Used   Substance Use Topics    Alcohol use: No      Family History   Problem Relation Age of Onset    Cancer Father         melanoma    Diabetes Other         Physical Exam   Vitals:       Visit Vitals  /77 (BP 1 Location: Left arm, BP Patient Position: Sitting)   Pulse 93   Temp 97.8 °F (36.6 °C) (Oral)   Resp 14   Ht 5' 10\" (1.778 m)   Wt 125 lb (56.7 kg)   SpO2 98%   Breastfeeding No Comment: Stopped in August.   BMI 17.94 kg/m²        Physical Exam  Constitutional:       Appearance: Normal appearance. She is not ill-appearing. Cardiovascular:      Rate and Rhythm: Normal rate and regular rhythm. Heart sounds: No murmur. No friction rub. No gallop. Pulmonary:      Effort: No respiratory distress. Breath sounds: No wheezing, rhonchi or rales. Neurological:      Mental Status: She is alert and oriented to person, place, and time. Cranial Nerves: No cranial nerve deficit. Sensory: No sensory deficit. Motor: No weakness. Coordination: Coordination normal.      Gait: Gait normal.   Psychiatric:         Thought Content:  Thought content normal.         Judgment: Judgment normal.      Comments: Anxious

## 2020-11-17 DIAGNOSIS — F41.9 ANXIETY: ICD-10-CM

## 2020-11-17 RX ORDER — BUSPIRONE HYDROCHLORIDE 5 MG/1
5 TABLET ORAL 3 TIMES DAILY
Qty: 90 TAB | Refills: 1 | Status: SHIPPED | OUTPATIENT
Start: 2020-11-17 | End: 2020-12-29 | Stop reason: SDUPTHER

## 2020-11-17 NOTE — TELEPHONE ENCOUNTER
----- Message from [facility] sent at 11/17/2020 12:03 PM EST -----  Regarding: Dr. Octavia Nunn / Glynn Breaux (if not patient): self  Relationship of caller (if not patient): self  Best contact number(s): [phone]  Name of medication and dosage if known: Buspar  Is patient out of this medication (yes/no):  No but will be in 2 days  Pharmacy name:  [facility] listed in chart? (yes/no): Yes  Pharmacy phone number: [phone]   Date of last visit: 10/09/20  Details to clarify the request: n/a

## 2020-12-29 ENCOUNTER — VIRTUAL VISIT (OUTPATIENT)
Dept: INTERNAL MEDICINE CLINIC | Age: 25
End: 2020-12-29
Payer: COMMERCIAL

## 2020-12-29 DIAGNOSIS — F41.9 ANXIETY: ICD-10-CM

## 2020-12-29 PROCEDURE — 99213 OFFICE O/P EST LOW 20 MIN: CPT | Performed by: INTERNAL MEDICINE

## 2020-12-29 RX ORDER — BUSPIRONE HYDROCHLORIDE 5 MG/1
TABLET ORAL
Qty: 120 TAB | Refills: 3 | Status: SHIPPED | OUTPATIENT
Start: 2020-12-29

## 2020-12-29 NOTE — PROGRESS NOTES
Consent: Chloe Montiel, who was seen by synchronous (real-time) audio-video technology, and/or her healthcare decision maker, is aware that this patient-initiated, Telehealth encounter on 12/29/2020 is a billable service, with coverage as determined by her insurance carrier. She is aware that she may receive a bill and has provided verbal consent to proceed: YES  [id number]  Subjective:   Chloe Montiel is a 26 y.o. female who was seen for Medication Refill and Medication Evaluation      She was seen here early October with anxiety symptoms and syncopal spells, which were thought to be anxiety related. She was started on BuSpar 5 mg t.i.d. and feels it has been very helpful. She has had mild nausea, which has improved with time. No other perceived side effects. No further syncopal episodes. Overall feels less anxious. Would like to bump the dose a little bit. Also notes that on her Microgestin pills she has had two menstrual cycles and this is unusual for her. She will touch base with gynecology. Current Outpatient Medications   Medication Sig    busPIRone (BUSPAR) 5 mg tablet 1 po qam 1 po at noon 2 po with dinner    norethindrone-ethinyl estradiol (MICROGESTIN 1/20) 1-20 mg-mcg tablet Take 1 Tab by mouth daily.  ibuprofen (MOTRIN) 800 mg tablet Take 1 Tab by mouth every eight (8) hours as needed for Pain.  prenatal vit37/iron/folic acid (PRENATA PO) Take 1 Tab by mouth daily.  fexofenadine-pseudoephedrine (ALLEGRA-D)  mg Tb12 Take 1 Tab by mouth every twelve (12) hours.  ferrous sulfate (IRON) 325 mg (65 mg iron) tablet Take  by mouth Daily (before breakfast). No current facility-administered medications for this visit.         Allergies   Allergen Reactions    Fentanyl Swelling     Throat swelling       Past Medical History:   Diagnosis Date    Anemia     Chlamydia     not during pregnancy    Headache        ROS  All other systems reviewed and negative, unless mentioned in HPI    Objective:   Vital Signs: (As obtained by patient/caregiver at home)  There were no vitals taken for this visit. [INSTRUCTIONS:  \"[x]\" Indicates a positive item  \"[]\" Indicates a negative item  -- DELETE ALL ITEMS NOT EXAMINED]    Constitutional: [x] Appears well-developed and well-nourished [x] No apparent distress      [] Abnormal -     Mental status: [x] Alert and awake  [x] Oriented to person/place/time [x] Able to follow commands    [] Abnormal -     Eyes:   EOM    [x]  Normal    [] Abnormal -   Sclera  [x]  Normal    [] Abnormal -          Discharge [x]  None visible   [] Abnormal -     HENT: [x] Normocephalic, atraumatic  [] Abnormal -       External Ears [x] Normal  [] Abnormal -    Neck: [x] No visualized mass [] Abnormal -     Pulmonary/Chest: [x] Respiratory effort normal   [x] No visualized signs of difficulty breathing or respiratory distress        [] Abnormal -      Musculoskeletal:            [x] Normal range of motion of neck        [] Abnormal -     Neurological:        [x] No Facial Asymmetry (Cranial nerve 7 motor function) (limited exam due to video visit)          [x] No gaze palsy        [] Abnormal -          Skin:        [x] No significant exanthematous lesions or discoloration noted on facial skin         [] Abnormal -            Psychiatric:       [x] Normal Affect [] Abnormal -           Other pertinent observable physical exam findings:-        Assessment & Plan:   Diagnoses and all orders for this visit:    1. Anxiety-doing well on the buspar 5 mg tid will inc dose to a total of 20 mg per 24 hour period   Cont with food  appt in 3mo with pcp sooner prn  -     busPIRone (BUSPAR) 5 mg tablet; 1 po qam 1 po at noon 2 po with dinner            We discussed the expected course, resolution and complications of the diagnosis(es) in detail. Medication risks, benefits, costs, interactions, and alternatives were discussed as indicated.   I advised her to contact the office if her condition worsens, changes or fails to improve as anticipated. She expressed understanding with the diagnosis(es) and plan. Trip Farrar is a 26 y.o. female being evaluated by a video visit encounter for concerns as above. A caregiver was present when appropriate. Due to this being a TeleHealth encounter (During [facility]- public health emergency), evaluation of the following organ systems was limited: Vitals/Constitutional/EENT/Resp/CV/GI//MS/Neuro/Skin/Heme-Lymph-Imm. Pursuant to the emergency declaration under the [facility], [facility] waiver authority and the [employer] Act, this Virtual  Visit was conducted, with patient's (and/or legal guardian's) consent, to reduce the patient's risk of exposure to COVID-19 and provide necessary medical care. Services were provided through a video synchronous discussion virtually to substitute for in-person clinic visit. Patient and provider were located at their individual homes.